# Patient Record
Sex: FEMALE | Race: WHITE | NOT HISPANIC OR LATINO | Employment: STUDENT | URBAN - METROPOLITAN AREA
[De-identification: names, ages, dates, MRNs, and addresses within clinical notes are randomized per-mention and may not be internally consistent; named-entity substitution may affect disease eponyms.]

---
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
## 2024-12-23 ENCOUNTER — OFFICE VISIT (OUTPATIENT)
Dept: FAMILY MEDICINE CLINIC | Facility: CLINIC | Age: 19
End: 2024-12-23
Payer: COMMERCIAL

## 2024-12-23 VITALS
WEIGHT: 206 LBS | HEIGHT: 66 IN | HEART RATE: 85 BPM | TEMPERATURE: 98 F | BODY MASS INDEX: 33.11 KG/M2 | OXYGEN SATURATION: 98 % | DIASTOLIC BLOOD PRESSURE: 85 MMHG | SYSTOLIC BLOOD PRESSURE: 120 MMHG

## 2024-12-23 DIAGNOSIS — Z00.00 ANNUAL PHYSICAL EXAM: ICD-10-CM

## 2024-12-23 DIAGNOSIS — F41.8 ANXIETY WITH DEPRESSION: ICD-10-CM

## 2024-12-23 DIAGNOSIS — S09.90XA TRAUMATIC INJURY OF HEAD, INITIAL ENCOUNTER: Primary | ICD-10-CM

## 2024-12-23 DIAGNOSIS — E78.00 ELEVATED LDL CHOLESTEROL LEVEL: ICD-10-CM

## 2024-12-23 DIAGNOSIS — J30.2 SEASONAL ALLERGIES: ICD-10-CM

## 2024-12-23 DIAGNOSIS — S06.0X0A CONCUSSION WITHOUT LOSS OF CONSCIOUSNESS, INITIAL ENCOUNTER: ICD-10-CM

## 2024-12-23 DIAGNOSIS — E66.9 OBESITY (BMI 30-39.9): ICD-10-CM

## 2024-12-23 DIAGNOSIS — N83.209 CYST OF OVARY, UNSPECIFIED LATERALITY: ICD-10-CM

## 2024-12-23 DIAGNOSIS — G43.119 INTRACTABLE MIGRAINE WITH AURA WITHOUT STATUS MIGRAINOSUS: ICD-10-CM

## 2024-12-23 PROBLEM — S06.0XAA CONCUSSION: Status: ACTIVE | Noted: 2024-12-23

## 2024-12-23 PROCEDURE — 99395 PREV VISIT EST AGE 18-39: CPT | Performed by: NURSE PRACTITIONER

## 2024-12-23 RX ORDER — DROSPIRENONE AND ETHINYL ESTRADIOL 0.03MG-3MG
1 KIT ORAL DAILY
COMMUNITY
Start: 2024-12-17

## 2024-12-23 NOTE — ASSESSMENT & PLAN NOTE
Ovarian cyst- She reports she had this in March and reports it has gone down. She went to GYN and on new birth control and is helping.

## 2024-12-23 NOTE — ASSESSMENT & PLAN NOTE
Patient's (Body mass index is 33.27 kg/m².)      Obesity- She has been trying to exercise 2-3 times a week. She tries to eat healthy.     Continue healthy diet and exercise
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
## 2024-12-23 NOTE — ASSESSMENT & PLAN NOTE
Annual physical-Father- CABG. Denies smoking, drinking, illegal drug.       Labs-Due  Papsmear-UTD      Vaccines:  Flu-Refused      Dental exam-UTD   Eye exam-Denies any visual issues     Encourage healthy diet and exercise  Encouraged monthly self breast exams  Check labs

## 2024-12-23 NOTE — ASSESSMENT & PLAN NOTE
Anxiety with depression-Controlled on lexapro. She sees a psychiatrist. Denies SI or HI. She used to see a counselor.

## 2024-12-23 NOTE — ASSESSMENT & PLAN NOTE
Migraines- She reports these have been better. She takes zofran to help with symptoms. She gets the headache weekly and dull. She takes motrin and reports this helps

## 2024-12-31 DIAGNOSIS — R79.82 ELEVATED C-REACTIVE PROTEIN (CRP): Primary | ICD-10-CM

## 2025-01-10 ENCOUNTER — CLINICAL SUPPORT (OUTPATIENT)
Dept: FAMILY MEDICINE CLINIC | Facility: CLINIC | Age: 20
End: 2025-01-10

## 2025-01-10 DIAGNOSIS — R79.82 ELEVATED C-REACTIVE PROTEIN (CRP): ICD-10-CM

## 2025-01-10 PROCEDURE — 86140 C-REACTIVE PROTEIN: CPT | Performed by: NURSE PRACTITIONER

## 2025-01-10 PROCEDURE — 80061 LIPID PANEL: CPT | Performed by: NURSE PRACTITIONER

## 2025-01-10 NOTE — PROGRESS NOTES
Venipuncture performed in L ARM by RT Fiordaliza  with good hemostasis. Patient tolerated well. 01/10/25 Melanie Sprague, RT

## 2025-01-11 LAB — CRP SERPL-MCNC: 1.08 MG/DL (ref 0–0.5)
